# Patient Record
Sex: FEMALE | URBAN - METROPOLITAN AREA
[De-identification: names, ages, dates, MRNs, and addresses within clinical notes are randomized per-mention and may not be internally consistent; named-entity substitution may affect disease eponyms.]

---

## 2017-02-15 ENCOUNTER — PRENATAL OFFICE VISIT (OUTPATIENT)
Dept: NURSING | Facility: CLINIC | Age: 37
End: 2017-02-15
Payer: COMMERCIAL

## 2017-02-15 DIAGNOSIS — N91.2 AMENORRHEA: Primary | ICD-10-CM

## 2017-02-15 LAB — BETA HCG QUAL IFA URINE: POSITIVE

## 2017-02-15 PROCEDURE — 84703 CHORIONIC GONADOTROPIN ASSAY: CPT | Performed by: FAMILY MEDICINE

## 2017-02-15 PROCEDURE — 99207 ZZC NO CHARGE NURSE ONLY: CPT

## 2017-02-15 NOTE — MR AVS SNAPSHOT
"              After Visit Summary   2/15/2017    Misty ESCOBEDO Handler    MRN: [id number]           Patient Information     Date Of Birth          1980        Visit Information        Provider Department      2/15/2017 2:00 PM CR OB CLINIC [facility]        Today's Diagnoses     Amenorrhea    -  1       Follow-ups after your visit        Your next 10 appointments already scheduled     Mar 13, 2017  3:00 PM CDT   New Prenatal with Jeanine Crews MD   [facility] ([facility])    [address] [place] [id number] [phone]              Who to contact     If you have questions or need follow up information about today's clinic visit or your schedule please contact [facility] directly at [phone].  Normal or non-critical lab and imaging results will be communicated to you by MyChart, letter or phone within 4 business days after the clinic has received the results. If you do not hear from us within 7 days, please contact the clinic through MyChart or phone. If you have a critical or abnormal lab result, we will notify you by phone as soon as possible.  Submit refill requests through Mowbly or call your pharmacy and they will forward the refill request to us. Please allow 3 business days for your refill to be completed.          Additional Information About Your Visit        judohart Information     Mowbly lets you send messages to your doctor, view your test results, renew your prescriptions, schedule appointments and more. To sign up, go to [email or web address] . Click on \"Log in\" on the left side of the screen, which will take you to the Welcome page. Then click on \"Sign up Now\" on the right side of the page.     You will be asked to enter the access code listed below, as well as some personal information. Please follow the directions to create your username and password.     Your access code is: " [email or web address]  Expires: 2017  3:02 PM     Your access code will  in 90 days. If you need help or a new code, please call your [place] clinic or [phone].        Care EveryWhere ID     This is your Care EveryWhere ID. This could be used by other organizations to access your [place] medical records  [id number]        Your Vitals Were     Last Period                   2016 (Exact Date)            Blood Pressure from Last 3 Encounters:   08/31/15 120/77   08/26/15 147/90   13 124/82    Weight from Last 3 Encounters:   08/31/15 155 lb (70.3 kg)   08/26/15 155 lb 6.4 oz (70.5 kg)   13 149 lb (67.6 kg)              We Performed the Following     Beta HCG qual IFA urine        Primary Care Provider Office Phone # Fax #    Jeanine Crews [phone]       [facility] [address] [facility] [address]        Thank you!     Thank you for choosing [facility]  for your care. Our goal is always to provide you with excellent care. Hearing back from our patients is one way we can continue to improve our services. Please take a few minutes to complete the written survey that you may receive in the mail after your visit with us. Thank you!             Your Updated Medication List - Protect others around you: Learn how to safely use, store and throw away your medicines at www.disposemymeds.org.          This list is accurate as of: 2/15/17  3:02 PM.  Always use your most recent med list.                   Brand Name Dispense Instructions for use    fluocinolone acetonide 0.01 % Oil     1 Bottle    Reported on 2/15/2017       NO ACTIVE MEDICATIONS          PRENATAL ONE DAILY 27-0.8 MG Tabs     100 tablet    Take 1 tablet by mouth daily       triamcinolone 0.1 % lotion    KENALOG    60 mL    Apply sparingly to affected area three times daily as needed.  Gets at derm and uses as needed

## 2017-02-15 NOTE — NURSING NOTE
Subjective: 36 year old patient presents for pregnancy confirmation. Her last menstrual period was 16. She has had a positive home test This is her 3rd pregnancy pregnancy, G3, P1. She has had previous  and one miscarriage. She would like to be seen Dr. Cao for her prenatal care. She has started prenatal vitamins.      Exam:  General Appearance: appears well.  Her urine pregnancy test is positive.    Assessment: positive pregnancy confirmation.    Plan: Patient has an EDC of 10/1/17. Is currently 7w3d weeks along. I schedule her first OB appointment with Dr. Cao on . Risk assessment done. We discussed basic pregnancy care, diet, exercise and prenatal vitamins.     Pre Term Labor Risk Assessment   1. Is the patient's age <18 or >40? no  2. Does the patient have a BMI < 18.5? no  3. If previous pregnancy, was delivery within previous 6 months? no  4. Have you ever been diagnosed with pyelonephritis? no  5. Have you ever delivered a baby prior to 37 weeks gestation? no  6. Have you ever been told you have a uterine anomaly? no  7. Do you currently have uterine fibroids? no  8. Have you had any gynecological surgical procedures such as cervical conization, a LEEP procedure, laser treatment or cryosurgery of the cervix? no  9. Did your mother take DEBBIE or any other hormones when she was pregnant with you? no  10. Did conception for this pregnancy occur via In Vitro Fertilization? no  11. Are you carrying twins? no  12. Do you currently use tobacco products? no  13. Prior to this pregnancy, how much alcohol did you drink each week? (if >7/week= high risk..)  couple  14. Since you learned you were pregnant, how much beer, wine or hard liquor did you drink per week? (anything >0 = high risk) no  15. Prior to learning you are pregnant, did you use any of the following: marijuana, prescription narcotics, speed, cocaine, heroin, hallucinogens or other drugs? (any use within 1 yr = high risk)   none  16. Since you learned you are pregnant, have you used any of the following: marijuana, prescription narcotics, speed, cocaine, heroin, hallucinogens or other drugs? (any use = high risk)  none  17. Do you have a history of chemical dependency (yes=high risk)  no  18. Have you ever been treated for more than 1 Urinary Tract Infection during this pregnancy? no  19. Do you have a history of Depression, Bi-polar disorder, anxiety, schizophrenia or other mental illness?  no  20. Are you currently being treated for depression, bi-polar disorder, anxiety, schizophrenia or other mental illness? no  21. Have you had Chlamydia or gonorrhea during this pregnancy? no  22. Periodontal disease (gum disease)? no  23. Has anyone hit, slapped, kicked or otherwise hurt you? no  24. Has anyone forced you to have sex when you didn't want to? no  Summary:   Patient is not high risk for  Labor

## 2017-03-13 ENCOUNTER — RESULT FOLLOW UP (OUTPATIENT)
Dept: FAMILY MEDICINE | Facility: CLINIC | Age: 37
End: 2017-03-13

## 2017-03-13 ENCOUNTER — PRENATAL OFFICE VISIT (OUTPATIENT)
Dept: FAMILY MEDICINE | Facility: CLINIC | Age: 37
End: 2017-03-13
Payer: COMMERCIAL

## 2017-03-13 VITALS
DIASTOLIC BLOOD PRESSURE: 82 MMHG | BODY MASS INDEX: 30.58 KG/M2 | SYSTOLIC BLOOD PRESSURE: 116 MMHG | WEIGHT: 162 LBS | HEART RATE: 109 BPM | HEIGHT: 61 IN | TEMPERATURE: 98.5 F

## 2017-03-13 DIAGNOSIS — Z34.91 PRENATAL CARE, FIRST TRIMESTER: Primary | ICD-10-CM

## 2017-03-13 DIAGNOSIS — Z11.51 SCREENING FOR HUMAN PAPILLOMAVIRUS: ICD-10-CM

## 2017-03-13 DIAGNOSIS — R87.810 CERVICAL HIGH RISK HPV (HUMAN PAPILLOMAVIRUS) TEST POSITIVE: ICD-10-CM

## 2017-03-13 DIAGNOSIS — R87.610 PAP SMEAR WITH ATYPICAL SQUAMOUS CELLS, CANNOT EXCLUDE HIGH GRADE SQUAMOUS INTRAEPITHELIAL LESION (ASC-H): ICD-10-CM

## 2017-03-13 LAB
ALBUMIN UR-MCNC: NEGATIVE MG/DL
APPEARANCE UR: CLEAR
BILIRUB UR QL STRIP: NEGATIVE
COLOR UR AUTO: YELLOW
ERYTHROCYTE [DISTWIDTH] IN BLOOD BY AUTOMATED COUNT: 12.7 % (ref 10–15)
GLUCOSE UR STRIP-MCNC: NEGATIVE MG/DL
HCT VFR BLD AUTO: 39.3 % (ref 35–47)
HGB BLD-MCNC: 13.4 G/DL (ref 11.7–15.7)
HGB UR QL STRIP: ABNORMAL
KETONES UR STRIP-MCNC: NEGATIVE MG/DL
LEUKOCYTE ESTERASE UR QL STRIP: NEGATIVE
MCH RBC QN AUTO: 30.9 PG (ref 26.5–33)
MCHC RBC AUTO-ENTMCNC: 34.1 G/DL (ref 31.5–36.5)
MCV RBC AUTO: 91 FL (ref 78–100)
MUCOUS THREADS #/AREA URNS LPF: PRESENT /LPF
NITRATE UR QL: NEGATIVE
PH UR STRIP: 5.5 PH (ref 5–7)
PLATELET # BLD AUTO: 362 10E9/L (ref 150–450)
RBC # BLD AUTO: 4.34 10E12/L (ref 3.8–5.2)
RBC #/AREA URNS AUTO: ABNORMAL /HPF (ref 0–2)
SP GR UR STRIP: 1.01 (ref 1–1.03)
URN SPEC COLLECT METH UR: ABNORMAL
UROBILINOGEN UR STRIP-ACNC: 0.2 EU/DL (ref 0.2–1)
WBC # BLD AUTO: 10.1 10E9/L (ref 4–11)
WBC #/AREA URNS AUTO: ABNORMAL /HPF (ref 0–2)

## 2017-03-13 PROCEDURE — 87389 HIV-1 AG W/HIV-1&-2 AB AG IA: CPT | Performed by: FAMILY MEDICINE

## 2017-03-13 PROCEDURE — 87591 N.GONORRHOEAE DNA AMP PROB: CPT | Performed by: FAMILY MEDICINE

## 2017-03-13 PROCEDURE — 86900 BLOOD TYPING SEROLOGIC ABO: CPT | Performed by: FAMILY MEDICINE

## 2017-03-13 PROCEDURE — 36415 COLL VENOUS BLD VENIPUNCTURE: CPT | Performed by: FAMILY MEDICINE

## 2017-03-13 PROCEDURE — 86901 BLOOD TYPING SEROLOGIC RH(D): CPT | Performed by: FAMILY MEDICINE

## 2017-03-13 PROCEDURE — 81001 URINALYSIS AUTO W/SCOPE: CPT | Performed by: FAMILY MEDICINE

## 2017-03-13 PROCEDURE — 99207 ZZC FIRST OB VISIT: CPT | Performed by: FAMILY MEDICINE

## 2017-03-13 PROCEDURE — G0145 SCR C/V CYTO,THINLAYER,RESCR: HCPCS | Performed by: FAMILY MEDICINE

## 2017-03-13 PROCEDURE — 87624 HPV HI-RISK TYP POOLED RSLT: CPT | Performed by: FAMILY MEDICINE

## 2017-03-13 PROCEDURE — 86762 RUBELLA ANTIBODY: CPT | Performed by: FAMILY MEDICINE

## 2017-03-13 PROCEDURE — 86850 RBC ANTIBODY SCREEN: CPT | Performed by: FAMILY MEDICINE

## 2017-03-13 PROCEDURE — 86780 TREPONEMA PALLIDUM: CPT | Performed by: FAMILY MEDICINE

## 2017-03-13 PROCEDURE — 87340 HEPATITIS B SURFACE AG IA: CPT | Performed by: FAMILY MEDICINE

## 2017-03-13 PROCEDURE — 85027 COMPLETE CBC AUTOMATED: CPT | Performed by: FAMILY MEDICINE

## 2017-03-13 PROCEDURE — 87491 CHLMYD TRACH DNA AMP PROBE: CPT | Performed by: FAMILY MEDICINE

## 2017-03-13 PROCEDURE — 87086 URINE CULTURE/COLONY COUNT: CPT | Performed by: FAMILY MEDICINE

## 2017-03-13 NOTE — LETTER
April 17, 2018      Misty ESCOBEDO Handler  [address] [facility] [address]    Dear ,      At [facility], your health and wellness is our primary concern. 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 Your provider had recommended that you have a Colposcopy completed by 5/19/18. Our records do not show that this has been scheduled.    It is important to complete the follow up that your provider has suggested for you to ensure that there are no worsening changes which may, over time, develop into cancer.      Please contact our office at  [phone] to schedule an appointment for a Colposcopy at your earliest convenience. If you have questions or concerns, please call the clinic and we will be happy to assist you.    If you have completed the tests outside of [facility], please have the results forwarded to our office. We will update the chart for your primary Physician to review before your next annual physical.     Thank you for choosing [facility]!    Sincerely,      Jeanine Crews MD/mica

## 2017-03-13 NOTE — PROGRESS NOTES
"Subjective:  Misty ESCOBEDO Handler is a 36 year old female  who presents today for her first prenatal visit.  She has never had an abnormal PAP smear.  Her LMP was 16.  She has had trouble with nausea.  This is her 3rd pregnancy.  She is a G 3 P 1011.    Her EDC is 10/1/2017.  She has the following questions:  1- at dentist and she needs a filling.   2-  She is wondering about coffee intake during pregnancy.       Current Outpatient Prescriptions   Medication Sig Dispense Refill     triamcinolone (KENALOG) 0.1 % lotion Apply sparingly to affected area three times daily as needed.  Gets at derm and uses as needed 60 mL 0     Prenatal Vit-Fe Fumarate-FA (PRENATAL ONE DAILY) 27-0.8 MG TABS Take 1 tablet by mouth daily 100 tablet 3       Past Medical History   Diagnosis Date     Abnormal Pap smear, can't excl hi gd sq intraepithelial lesion (ASC-H) 01/10/11     Papanicolaou smear of cervix with atypical squamous cells of undetermined significance (ASC-US)      repeat PAP nl, no colposcopy or biopsy done.      Unspecified contraceptive management        Past Surgical History   Procedure Laterality Date     C/section, low transverse  11     , Low Transverse for failure to descend       Family History   Problem Relation Age of Onset     Arthritis Mother      Family History Negative No family hx of        Social History   Substance Use Topics     Smoking status: Former Smoker     Years: 5.00     Quit date: 2006     Smokeless tobacco: Never Used     Alcohol use No       Objective:  Blood pressure 116/82, pulse 109, temperature 98.5  F (36.9  C), temperature source Oral, height 5' 1\" (1.549 m), weight 162 lb (73.5 kg), last menstrual period 2016, unknown if currently breastfeeding.  General appearance: Healthy.  Mental Status: cooperative, normal affect, no gross thought process defects.  HEENT:   Ears- Normal external canals and TMs  Eyes- PERRL, EOM's intact, fundi benign, sharp disc " margins.  Throat- Normal  Nodes- Negative  Thyroid: Normal to palpation, no enlargement or nodules noted.  Breasts: Symmetric without mass tenderness or discharge.  Axillary nodes negative.  Lungs: Clear to auscultation bilaterally  Heart.: Normal rate and rhythm.  No murmurs, clicks or gallops.  Pulses:    R-4/4, PT-4/4, DP-4/4  Abdomen: BS active. Soft, non-tender, no masses or organomegaly.  Aorta normal. No bruits.  Genitalia: Normal female external genitalia without lesions.  Speculum:  Cervix normal,  Pap taken, bimanual exam  12 week size uterus, no adenexal mass or tenderness.  Doptone UNABLE.  Extremities: Normal without edema  Reflexes:  Symmetric bilaterally and 2 + at the patella  Skin: Clear and free of rash.    Assessment:  1. Misty Nieves is a healthy 36 year old female here for a first prenatal visit.  2. Unable to get hearttones    Plan:  1. A Pap smear was obtained  2. Prenatal labs ordered - see epicare orders  3. Recheck in one week for heart tones  4. fetal anatomy survey to be done around 20 weeks gestation

## 2017-03-13 NOTE — MR AVS SNAPSHOT
After Visit Summary   3/13/2017    Misty ESCOBEDO Handler    MRN: [id number]           Patient Information     Date Of Birth          1980        Visit Information        Provider Department      3/13/2017 3:00 PM Jeanine Crews MD [facility]        Today's Diagnoses     Prenatal care, first trimester    -  1      Care Instructions      Fetal development begins soon after conception. Find out how your baby grows and develops during the first trimester.    You're pregnant. Congratulations! You'll undoubtedly spend the months ahead wondering how your baby is growing and developing. What does your baby look like? How big is he or she? When will you feel the first kick?  Fetal development typically follows a predictable course. Find out what happens during the first trimester by checking out this weekly calendar of events. Keep in mind that measurements are approximate.  It might seem strange, but you're not actually pregnant the first week or two of the time allotted to your pregnancy. Yes, you read that correctly!  Conception typically occurs about two weeks after your last period begins. To calculate your due date, your health care provider will count ahead 40 weeks from the start of your last period. This means your period is counted as part of your pregnancy -- even though you weren't pregnant at the time.  The sperm and egg unite in one of your fallopian tubes to form a one-celled entity called a zygote. If more than one egg is released and fertilized, you might have multiple zygotes.  The zygote typically has 46 chromosomes -- 23 from you and 23 from the father. These chromosomes help determine your baby's sex, traits such as eye and hair color, and, to some extent, personality and intelligence.  Soon after fertilization, the zygote travels down the fallopian tube toward the uterus. At the same time, it will begin dividing to form a cluster of cells resembling a tiny raspberry --  a morula.  By the time it reaches the uterus, the rapidly dividing ball of cells -- now known as a blastocyst -- has  into two sections.  The inner group of cells will become the embryo. The outer group will become the cells that nourish and protect it. On contact, the blastocyst will burrow into the uterine wall for nourishment. This process is called implantation.  The placenta, which will nourish your baby throughout the pregnancy, also begins to form.   The fifth week of pregnancy, or the third week after conception, marks the beginning of the embryonic period. This is when the baby's brain, spinal cord, heart and other organs begin to form.  The embryo is now made of three layers. The top layer -- the ectoderm -- will give rise to your baby's outermost layer of skin, central and peripheral nervous systems, eyes, inner ears, and many connective tissues.  Your baby's heart and a primitive circulatory system will form in the middle layer of cells -- the mesoderm. This layer of cells will also serve as the foundation for your baby's bones, muscles, kidneys and much of the reproductive system.  The inner layer of cells -- the endoderm -- will become a simple tube lined with mucous membranes. Your baby's lungs, intestines and bladder will develop here.  By the end of this week, your baby is likely about the size of the tip of a pen.  Growth is rapid this week. Just four weeks after conception, the neural tube along your baby's back is closing and your baby's heart is pumping blood.  Basic facial features will begin to appear, including passageways that will make up the inner ears and arches that will contribute to the jaw. Your baby's body begins to take on a C-shaped curvature. Small buds will soon become arms and legs.  Seven weeks into your pregnancy, or five weeks after conception, your baby's brain and face are rapidly developing. Tiny nostrils become visible, and the eye lenses begin to form. The arm  buds that sprouted last week now take on the shape of paddles.  By the end of this week, your baby might be a little bigger than the top of a pencil eraser.  Eight weeks into your pregnancy, or six weeks after conception, your baby's arms and legs are growing longer, and fingers have begun to form. The shell-shaped parts of your baby's ears also are forming, and your baby's eyes are visible. The upper lip and nose have formed. The trunk of your baby's body is beginning to straighten.  By the end of this week, your baby might be about 1/2 inch (11 to 14 millimeters) long.  In the ninth week of pregnancy, or seven weeks after conception, your baby's arms grow, develop bones and bend at the elbows. Toes form, and your baby's eyelids and ears continue developing.   By the end of this week, your baby might be about 3/4 inch (20 millimeters) long.   By the 10th week of pregnancy, or eight weeks after conception, your baby's head has become more round. The neck begins to develop, and your baby's eyelids begin to close to protect his or her developing eyes.   At the beginning of the 11th week of pregnancy, or the ninth week after conception, your baby's head still makes up about half of its length. However, your baby's body is about to catch up, growing rapidly in the coming weeks.  Your baby is now officially described as a fetus. This week your baby's eyes are widely , the eyelids fused and the ears low set. Red blood cells are beginning to form in your baby's liver. By the end of this week, your baby's external genitalia will start developing into a penis or clitoris and labia majora.  By now your baby might measure about 2 inches (50 millimeters) long from crown to rump and weigh almost 1/3 ounce (8 grams).  Twelve weeks into your pregnancy, or 10 weeks after conception, your baby is developing fingernails. Your baby's face now has a human profile.  By now your baby might be about 2 1/2 inches (60 millimeters)  "long from crown to rump and weigh about 1/2 ounce (14 grams).                Follow-ups after your visit        Future tests that were ordered for you today     Open Future Orders        Priority Expected Expires Ordered    US OB > 14 Weeks Complete Single Routine  3/13/2018 3/13/2017            Who to contact     If you have questions or need follow up information about today's clinic visit or your schedule please contact [facility] directly at [phone].  Normal or non-critical lab and imaging results will be communicated to you by WebPayhart, letter or phone within 4 business days after the clinic has received the results. If you do not hear from us within 7 days, please contact the clinic through WebPayhart or phone. If you have a critical or abnormal lab result, we will notify you by phone as soon as possible.  Submit refill requests through ScaleOut Software or call your pharmacy and they will forward the refill request to us. Please allow 3 business days for your refill to be completed.          Additional Information About Your Visit        ScaleOut Software Information     ScaleOut Software lets you send messages to your doctor, view your test results, renew your prescriptions, schedule appointments and more. To sign up, go to [email or web address] . Click on \"Log in\" on the left side of the screen, which will take you to the Welcome page. Then click on \"Sign up Now\" on the right side of the page.     You will be asked to enter the access code listed below, as well as some personal information. Please follow the directions to create your username and password.     Your access code is: [email or web address]  Expires: 2017  4:02 PM     Your access code will  in 90 days. If you need help or a new code, please call your [place] clinic or [phone].        Care EveryWhere ID     This is your Care EveryWhere ID. This could be used by other organizations to access your [facility] medical records  [id number]        Your " "Vitals Were     Pulse Temperature Height Last Period BMI (Body Mass Index)       109 98.5  F (36.9  C) (Oral) 5' 1\" (1.549 m) 12/25/2016 (Exact Date) 30.61 kg/m2        Blood Pressure from Last 3 Encounters:   03/13/17 116/82   08/31/15 120/77   08/26/15 147/90    Weight from Last 3 Encounters:   03/13/17 162 lb (73.5 kg)   08/31/15 155 lb (70.3 kg)   08/26/15 155 lb 6.4 oz (70.5 kg)              We Performed the Following     ABO/Rh type and screen     Anti Treponema     CBC with platelets     Chlamydia trachomatis PCR     Hepatitis B surface antigen     HIV Antigen Antibody Combo     Neisseria gonorrhoeae PCR     PAP imaged thin layer screen     Rubella Antibody IgG Quantitative     UA reflex to Microscopic and Culture     Urine Culture Aerobic Bacterial          Today's Medication Changes          These changes are accurate as of: 3/13/17  3:58 PM.  If you have any questions, ask your nurse or doctor.               Stop taking these medicines if you haven't already. Please contact your care team if you have questions.     NO ACTIVE MEDICATIONS   Stopped by:  Jeanine Crews MD                    Primary Care Provider Office Phone # Fax #    Jeanine Crews [phone]       [facility] [id number] [place] [address]        Thank you!     Thank you for choosing [facility]  for your care. Our goal is always to provide you with excellent care. Hearing back from our patients is one way we can continue to improve our services. Please take a few minutes to complete the written survey that you may receive in the mail after your visit with us. Thank you!             Your Updated Medication List - Protect others around you: Learn how to safely use, store and throw away your medicines at www.disposemymeds.org.          This list is accurate as of: 3/13/17  3:58 PM.  Always use your most recent med list.                   Brand Name Dispense Instructions for use    " PRENATAL ONE DAILY 27-0.8 MG Tabs     100 tablet    Take 1 tablet by mouth daily       triamcinolone 0.1 % lotion    KENALOG    60 mL    Apply sparingly to affected area three times daily as needed.  Gets at derm and uses as needed

## 2017-03-13 NOTE — NURSING NOTE
"Chief Complaint   Patient presents with     Prenatal Care     11 weeks 1 day       Initial /82 (BP Location: Right arm, Patient Position: Chair, Cuff Size: Adult Regular)  Pulse 109  Temp 98.5  F (36.9  C) (Oral)  Ht 5' 1\" (1.549 m)  Wt 162 lb (73.5 kg)  LMP 12/25/2016 (Exact Date)  BMI 30.61 kg/m2 Estimated body mass index is 30.61 kg/(m^2) as calculated from the following:    Height as of this encounter: 5' 1\" (1.549 m).    Weight as of this encounter: 162 lb (73.5 kg).  Medication Reconciliation: complete     Karli Billings CMA      "

## 2017-03-13 NOTE — LETTER
August 8, 2018      Misty ESCOBEDO Handler  [address] [facility] [address]    Dear ,      At [facility], your health and wellness is our primary concern. 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 Your provider had recommended that you have a Colposcopy completed by 5/19/18. Our records do not show that this has been scheduled.     It is important to complete the follow up that your provider has suggested for you to ensure that there are no worsening changes which may, over time, develop into cancer.      If you have chosen not to do the recommended colposcopy, please contact our office at [phone] to schedule an appointment for a repeat PAP smear and HPV test at your earliest convenience.    If you have completed the tests outside of [facility], please have the results forwarded to our office. We will update the chart for your primary Physician to review before your next annual physical.     Thank you for choosing [facility]!    Sincerely,      Jeanine Crews MD/mica

## 2017-03-14 LAB
ABO + RH BLD: NORMAL
ABO + RH BLD: NORMAL
BLD GP AB SCN SERPL QL: NORMAL
BLOOD BANK CMNT PATIENT-IMP: NORMAL
SPECIMEN EXP DATE BLD: NORMAL

## 2017-03-15 PROBLEM — O09.899 RUBELLA NON-IMMUNE STATUS, ANTEPARTUM: Status: ACTIVE | Noted: 2017-03-15

## 2017-03-15 PROBLEM — Z28.39 RUBELLA NON-IMMUNE STATUS, ANTEPARTUM: Status: ACTIVE | Noted: 2017-03-15

## 2017-03-15 LAB
BACTERIA SPEC CULT: NORMAL
C TRACH DNA SPEC QL NAA+PROBE: NORMAL
HBV SURFACE AG SERPL QL IA: NONREACTIVE
HIV 1+2 AB+HIV1 P24 AG SERPL QL IA: NORMAL
MICRO REPORT STATUS: NORMAL
N GONORRHOEA DNA SPEC QL NAA+PROBE: NORMAL
RUBV IGG SERPL IA-ACNC: 8 IU/ML
SPECIMEN SOURCE: NORMAL
T PALLIDUM IGG+IGM SER QL: NEGATIVE

## 2017-03-16 LAB
COPATH REPORT: NORMAL
PAP: NORMAL

## 2017-03-21 ENCOUNTER — TELEPHONE (OUTPATIENT)
Dept: FAMILY MEDICINE | Facility: CLINIC | Age: 37
End: 2017-03-21

## 2017-03-21 NOTE — TELEPHONE ENCOUNTER
Misty ESCOBEDO Handler is a 36 year old female who calls to report she is 11 weeks pregnant. Is scheduled to see IVONE tomorrow 10:45 AM for prenatal care. Had first ob visit with MD last week, tomorrow's visit to check for heartbeat .   3rd pregnancy   1 previous miscarriage so is concerned about vag discharge   One day brownish vaginal discharge on toilet tissue.  Concerned this might be early sign miscarriage.  Denies uterine cramping, abdominal/pelvic discomfort/pain, discomfort/pain in back . No pain.  No bright red blood   No urinary symptoms    Asks if OK to wait for appointment tomorrow?   Advised OK to wait.  However, if any bright red bleeding or pelvic cramping/pain to ER immediately.   Caller agrees to plan.  Guideline used: 1st Trimester Bleeding from  Telephone Triage for Obstetrics and Gynecology, Ally Hylton and Claudia Alvarez, RN

## 2017-03-22 ENCOUNTER — TELEPHONE (OUTPATIENT)
Dept: OBGYN | Facility: CLINIC | Age: 37
End: 2017-03-22

## 2017-03-22 ENCOUNTER — PRENATAL OFFICE VISIT (OUTPATIENT)
Dept: FAMILY MEDICINE | Facility: CLINIC | Age: 37
End: 2017-03-22
Payer: COMMERCIAL

## 2017-03-22 ENCOUNTER — RADIANT APPOINTMENT (OUTPATIENT)
Dept: ULTRASOUND IMAGING | Facility: CLINIC | Age: 37
End: 2017-03-22
Attending: FAMILY MEDICINE
Payer: COMMERCIAL

## 2017-03-22 ENCOUNTER — OFFICE VISIT (OUTPATIENT)
Dept: OBGYN | Facility: CLINIC | Age: 37
End: 2017-03-22
Payer: COMMERCIAL

## 2017-03-22 ENCOUNTER — TELEPHONE (OUTPATIENT)
Dept: FAMILY MEDICINE | Facility: CLINIC | Age: 37
End: 2017-03-22

## 2017-03-22 VITALS
SYSTOLIC BLOOD PRESSURE: 128 MMHG | WEIGHT: 160 LBS | HEIGHT: 61 IN | TEMPERATURE: 98.2 F | BODY MASS INDEX: 30.21 KG/M2 | DIASTOLIC BLOOD PRESSURE: 86 MMHG

## 2017-03-22 VITALS
BODY MASS INDEX: 30.29 KG/M2 | DIASTOLIC BLOOD PRESSURE: 86 MMHG | WEIGHT: 160.4 LBS | TEMPERATURE: 98.2 F | HEART RATE: 110 BPM | OXYGEN SATURATION: 99 % | HEIGHT: 61 IN | SYSTOLIC BLOOD PRESSURE: 128 MMHG | RESPIRATION RATE: 20 BRPM

## 2017-03-22 DIAGNOSIS — O20.9 ACCIDENTAL ANTEPARTUM HEMORRHAGE IN FIRST TRIMESTER: ICD-10-CM

## 2017-03-22 DIAGNOSIS — O02.1 MISSED ABORTION: Primary | ICD-10-CM

## 2017-03-22 DIAGNOSIS — O20.9 ACCIDENTAL ANTEPARTUM HEMORRHAGE IN FIRST TRIMESTER: Primary | ICD-10-CM

## 2017-03-22 LAB
FINAL DIAGNOSIS: ABNORMAL
HPV HR 12 DNA CVX QL NAA+PROBE: NEGATIVE
HPV16 DNA SPEC QL NAA+PROBE: POSITIVE
HPV18 DNA SPEC QL NAA+PROBE: NEGATIVE
SPECIMEN DESCRIPTION: ABNORMAL

## 2017-03-22 PROCEDURE — 99203 OFFICE O/P NEW LOW 30 MIN: CPT | Performed by: OBSTETRICS & GYNECOLOGY

## 2017-03-22 PROCEDURE — 76817 TRANSVAGINAL US OBSTETRIC: CPT | Mod: 59 | Performed by: OBSTETRICS & GYNECOLOGY

## 2017-03-22 PROCEDURE — 99207 ZZC PRENATAL VISIT: CPT | Performed by: FAMILY MEDICINE

## 2017-03-22 PROCEDURE — 76801 OB US < 14 WKS SINGLE FETUS: CPT | Performed by: OBSTETRICS & GYNECOLOGY

## 2017-03-22 RX ORDER — MISOPROSTOL 200 UG/1
800 TABLET ORAL EVERY 12 HOURS PRN
Qty: 4 TABLET | Refills: 0 | Status: SHIPPED | OUTPATIENT
Start: 2017-03-22 | End: 2017-03-22

## 2017-03-22 RX ORDER — PHENAZOPYRIDINE HYDROCHLORIDE 100 MG/1
200 TABLET, FILM COATED ORAL ONCE
Status: CANCELLED | OUTPATIENT
Start: 2017-03-22 | End: 2017-03-22

## 2017-03-22 RX ORDER — MISOPROSTOL 200 UG/1
800 TABLET ORAL EVERY 12 HOURS PRN
Qty: 4 TABLET | Refills: 0 | Status: SHIPPED | OUTPATIENT
Start: 2017-03-22 | End: 2017-08-09

## 2017-03-22 NOTE — TELEPHONE ENCOUNTER
Aline calling from ultrasound, she did not hear fetal heart tones  spoke to Dr Cao,  Plan to contact OB-GYN to evaluate pt and will then contact pt    Call cell at [phone]    Route to PCP    Eliza Roman RN, BS  Clinical Nurse Triage.

## 2017-03-22 NOTE — MR AVS SNAPSHOT
After Visit Summary   3/22/2017    Misty ESCOBEDO Handler    MRN: [id number]           Patient Information     Date Of Birth          1980        Visit Information        Provider Department      3/22/2017 3:45 PM Meet Bhakta MD [facility]        Today's Diagnoses     Missed     -  1       Follow-ups after your visit        Your next 10 appointments already scheduled     Mar 23, 2017   Procedure with Khushi Villasenor, DO   [facility] (--)    [address] [facility] [id number] [phone]            Mar 23, 2017  7:30 AM CDT   US INTRAOPERATIVE with RHUS1   [facility] Ultras[facility] ([facility])    [address] [place] [id number] [phone]           Please bring a list of your medicines (including vitamins, minerals and over-the-counter drugs). Also, tell your doctor about any allergies you may have. Wear comfortable clothes and leave your valuables at home.  You do not need to do anything special to prepare for your exam.  Please call the Imaging Department at your exam site with any questions.            2017  3:30 PM CDT   US OB > 14 WEEKS COMPLETE SINGLE with RIUS1   [facility]    [address]  [address]  [facility] [id number] [phone]           Please bring a list of your medicines (including vitamins, minerals and over-the-counter drugs). Also, tell your doctor about any allergies you may have. Wear comfortable clothes and leave your valuables at home.  If you re less than 20 weeks drink four 8-ounce glasses of fluid an hour before your exam. If you need to empty your bladder before your exam, try to release only a little urine. Then, drink another glass of fluid.  You may have up to two family members in the exam room. If you bring a small child, an adult must be there to care for him or her.  Please call the  "Imaging Department at your exam site with any questions.              Future tests that were ordered for you today     Open Future Orders        Priority Expected Expires Ordered    US Intraoperative Routine  3/22/2018 3/22/2017            Who to contact     If you have questions or need follow up information about today's clinic visit or your schedule please contact [facility] directly at [phone].  Normal or non-critical lab and imaging results will be communicated to you by MyChart, letter or phone within 4 business days after the clinic has received the results. If you do not hear from us within 7 days, please contact the clinic through [employer] or phone. If you have a critical or abnormal lab result, we will notify you by phone as soon as possible.  Submit refill requests through Wriggle or call your pharmacy and they will forward the refill request to us. Please allow 3 business days for your refill to be completed.          Additional Information About Your Visit        Wriggle Information     Wriggle lets you send messages to your doctor, view your test results, renew your prescriptions, schedule appointments and more. To sign up, go to [email or web address] . Click on \"Log in\" on the left side of the screen, which will take you to the Welcome page. Then click on \"Sign up Now\" on the right side of the page.     You will be asked to enter the access code listed below, as well as some personal information. Please follow the directions to create your username and password.     Your access code is: [email or web address]  Expires: 2017  4:02 PM     Your access code will  in 90 days. If you need help or a new code, please call your [facility] clinic or [phone].        Care EveryWhere ID     This is your Care EveryWhere ID. This could be used by other organizations to access your [facility] medical records  [id number]        Your Vitals Were     Temperature Height Last Period BMI (Body " "Mass Index)          98.2  F (36.8  C) (Oral) 5' 1\" (1.549 m) 2016 (Exact Date) 30.23 kg/m2         Blood Pressure from Last 3 Encounters:   17 128/86   17 128/86   17 116/82    Weight from Last 3 Encounters:   17 160 lb (72.6 kg)   17 160 lb 6.4 oz (72.8 kg)   17 162 lb (73.5 kg)              Today, you had the following     No orders found for display         Today's Medication Changes          These changes are accurate as of: 3/22/17  9:50 PM.  If you have any questions, ask your nurse or doctor.               Start taking these medicines.        Dose/Directions    misoprostol 200 MCG tablet   Commonly known as:  CYTOTEC   Used for:  Missed    Started by:  Meet Bhakta MD        Dose:  800 mcg   Place 4 tablets (800 mcg) vaginally every 12 hours as needed   Quantity:  4 tablet   Refills:  0            Where to get your medicines      These medications were sent to [facility] [place], [place] - [address] [place] [address] [address] [facility] [address]     Phone:  [phone]     misoprostol 200 MCG tablet                Primary Care Provider Office Phone # Fax #    Jeanine Crews [phone]       [facility] [address] [facility] [address]        Thank you!     Thank you for choosing [facility]  for your care. Our goal is always to provide you with excellent care. Hearing back from our patients is one way we can continue to improve our services. Please take a few minutes to complete the written survey that you may receive in the mail after your visit with us. Thank you!             Your Updated Medication List - Protect others around you: Learn how to safely use, store and throw away your medicines at www.disposemymeds.org.          This list is accurate as of: 3/22/17  9:50 PM.  Always use your most recent med list.                   Brand Name Dispense Instructions for use    " misoprostol 200 MCG tablet    CYTOTEC    4 tablet    Place 4 tablets (800 mcg) vaginally every 12 hours as needed       PRENATAL ONE DAILY 27-0.8 MG Tabs     100 tablet    Take 1 tablet by mouth daily       triamcinolone 0.1 % lotion    KENALOG    60 mL    Apply sparingly to affected area three times daily as needed.  Gets at derm and uses as needed

## 2017-03-22 NOTE — NURSING NOTE
"Chief Complaint   Patient presents with     Prenatal Care     12 weeks 3 days, pt was seen last week and MD wanted patient to f/u this week     Vaginal Bleeding     has some mild inconsistent vaginal spotting the past couple days. No c/o of big clots. Patient states the spotting is dark brown in color and only when she is going to the bathroom. Patient called nurse triage line.        Initial /86 (BP Location: Right arm, Patient Position: Chair, Cuff Size: Adult Regular)  Pulse 110  Temp 98.2  F (36.8  C) (Oral)  Resp 20  Ht 5' 1\" (1.549 m)  Wt 160 lb 6.4 oz (72.8 kg)  LMP 12/25/2016 (Exact Date)  SpO2 99%  BMI 30.31 kg/m2 Estimated body mass index is 30.31 kg/(m^2) as calculated from the following:    Height as of this encounter: 5' 1\" (1.549 m).    Weight as of this encounter: 160 lb 6.4 oz (72.8 kg).  Medication Reconciliation: complete    Pt will discuss getting maternal screening with MD. Karli Billings CMA    "

## 2017-03-22 NOTE — PROGRESS NOTES
"  SUBJECTIVE:  Misty ESCOBEDO Handler is a 36 year old,  female, G:3 P1  who presents for missed . 7 weeks 2 days fetal pole size. Discussed expectant management , surgical and medical management with cytotec. Desires cytotec tonoight with D&C on Thursday if no results. Leaving town on Friday. Notes some spotting.    Past Medical History:   Diagnosis Date     Abnormal Pap smear, can't excl hi gd sq intraepithelial lesion (ASC-H) 01/10/11     Papanicolaou smear of cervix with atypical squamous cells of undetermined significance (ASC-US)     repeat PAP nl, no colposcopy or biopsy done.      Unspecified contraceptive management                                           Past Surgical History:   Procedure Laterality Date     C/SECTION, LOW TRANSVERSE  11    , Low Transverse for failure to descend       Current Outpatient Prescriptions   Medication     misoprostol (CYTOTEC) 200 MCG tablet     triamcinolone (KENALOG) 0.1 % lotion     Prenatal Vit-Fe Fumarate-FA (PRENATAL ONE DAILY) 27-0.8 MG TABS     No current facility-administered medications for this visit.          Allergies   Allergen Reactions     No Known Drug Allergies                                                    Social History   Substance Use Topics     Smoking status: Former Smoker     Years: 5.00     Quit date: 2006     Smokeless tobacco: Never Used     Alcohol use No                      Review of Systems    CONSTITUTIONAL:NEGATIVE  EYES: NEGATIVE  ENT/MOUTH: NEGATIVE  RESP: NEGATIVE  CV: NEGATIVE  GI: NEGATIVE  : NEGATIVE  MUSCULOSKELATAL: NEGATIVE  INTEGUMENTARY/SKIN: NEGATIVE  BREAST: NEGATIVE  NEURO: NEGATIVE.      OBJECTIVE:  /86 (BP Location: Right arm, Patient Position: Chair, Cuff Size: Adult Regular)  Temp 98.2  F (36.8  C) (Oral)  Ht 5' 1\" (1.549 m)  Wt 160 lb (72.6 kg)  LMP 2016 (Exact Date)  BMI 30.23 kg/m2  Pelvis: deferred        ASSESSMENT:  Missed     PLAN:    1)Cytotec prescribed, " "instructions given. Pre-op below if needed.            Pre Op Exam: 2017    Misty ESCOBEDO Handler is an 36 year old year old female  here for preop physical.     /86 (BP Location: Right arm, Patient Position: Chair, Cuff Size: Adult Regular)  Temp 98.2  F (36.8  C) (Oral)  Ht 5' 1\" (1.549 m)  Wt 160 lb (72.6 kg)  LMP 2016 (Exact Date)  BMI 30.23 kg/m2    Social History   Substance Use Topics     Smoking status: Former Smoker     Years: 5.00     Quit date: 2006     Smokeless tobacco: Never Used     Alcohol use No       PRESENT HISTORY:    Reason for admission:Missed   Onset of Illness: now  Type of Surgery Anticipated: Suction D&C  Type of Anesthesia Anticipated:  General      Medications:   Current Outpatient Prescriptions   Medication     triamcinolone (KENALOG) 0.1 % lotion     Prenatal Vit-Fe Fumarate-FA (PRENATAL ONE DAILY) 27-0.8 MG TABS     No current facility-administered medications for this visit.          Any Aspirin within 10 days? No      Family History: Review of patient's family history indicates:    Family History Negative        No family hx of             No family history of malignant hyperthermia.  No family history of bleeding disorder.  No history of Sleep Apnea    Past Medical History:   Diagnosis Date     Abnormal Pap smear, can't excl hi gd sq intraepithelial lesion (ASC-H) 01/10/11     Papanicolaou smear of cervix with atypical squamous cells of undetermined significance (ASC-US)     repeat PAP nl, no colposcopy or biopsy done.      Unspecified contraceptive management        Past Surgical History:   Procedure Laterality Date     C/SECTION, LOW TRANSVERSE  11    , Low Transverse for failure to descend       Allergies   Allergen Reactions     No Known Drug Allergies        Transfusion reactions: No prior transfusions    Bleeding tendencies:No bleeding problems noted      REVIEW OF SYSTEMS:    Cardiovascular: NORMAL  Respiratory: " "NORMAL  Gastrointestinal: NORMAL  Genitourinary: NORMAL    Patient's last menstrual period was 2016 (exact date)..      PHYSICAL EXAM:  /86 (BP Location: Right arm, Patient Position: Chair, Cuff Size: Adult Regular)  Temp 98.2  F (36.8  C) (Oral)  Ht 5' 1\" (1.549 m)  Wt 160 lb (72.6 kg)  LMP 2016 (Exact Date)  BMI 30.23 kg/m2   General appearance: Healthy.  Skin: Normal.  Mental Status: cooperative, normal affect, no gross thought process defects.  Thyroid: Normal to palpation, no enlargement or nodules noted.  Breasts: not done  Lungs: Clear to auscultation.   Heart.: Normal rate and rhythm.  No murmurs, clicks or gallops.   Abdomen: BS active. Soft, non-tender, no masses or organomegaly.    Pelvis: deferred  Extremities: deferred     Normal  Comments: Discussed indication, and risks of surgery, such as infection , bleeding, damage to bowel or bladder.    Impression: Missed       MD Signature: ________________________________________________  Meet Bhakta MD   "

## 2017-03-22 NOTE — PROGRESS NOTES
Patient returns today at 12 weeks to listen for heart tones.   Is still feeling some sickness and nausea but is better  Does mentions some old brown blood last few times she has urinated on toilet paper - no cramping  Going to [place] on vacation on Friday.     Will get u/s for viability today and if no heart beat, will refer to OB

## 2017-03-22 NOTE — MR AVS SNAPSHOT
After Visit Summary   3/22/2017    Misty ESCOBEDO Handler    MRN: [id number]           Patient Information     Date Of Birth          1980        Visit Information        Provider Department      3/22/2017 10:45 AM Jeanine Crews MD [facility]        Today's Diagnoses     Accidental antepartum hemorrhage in first trimester    -  1       Follow-ups after your visit        Your next 10 appointments already scheduled     Mar 22, 2017  2:45 PM CDT   US OB < 14 WEEKS SINGLE with OXUS1   [facility]    [address] [id number] [phone]           Please bring a list of your medicines (including vitamins, minerals and over-the-counter drugs). Also, tell your doctor about any allergies you may have. Wear comfortable clothes and leave your valuables at home.  If you re less than 20 weeks drink four 8-ounce glasses of fluid an hour before your exam. If you need to empty your bladder before your exam, try to release only a little urine. Then, drink another glass of fluid.  You may have up to two family members in the exam room. If you bring a small child, an adult must be there to care for him or her.  Please call the Imaging Department at your exam site with any questions.            Apr 04, 2017  3:30 PM CDT   US OB > 14 WEEKS COMPLETE SINGLE with RIUS1   [facility]    [address]  [facility] [address] [phone]           Please bring a list of your medicines (including vitamins, minerals and over-the-counter drugs). Also, tell your doctor about any allergies you may have. Wear comfortable clothes and leave your valuables at home.  If you re less than 20 weeks drink four 8-ounce glasses of fluid an hour before your exam. If you need to empty your bladder before your exam, try to release only a little urine. Then, drink  "another glass of fluid.  You may have up to two family members in the exam room. If you bring a small child, an adult must be there to care for him or her.  Please call the Imaging Department at your exam site with any questions.              Future tests that were ordered for you today     Open Future Orders        Priority Expected Expires Ordered    US OB < 14 Weeks Single Routine  3/22/2018 3/22/2017            Who to contact     If you have questions or need follow up information about today's clinic visit or your schedule please contact [facility] directly at [phone].  Normal or non-critical lab and imaging results will be communicated to you by [employer]hart, letter or phone within 4 business days after the clinic has received the results. If you do not hear from us within 7 days, please contact the clinic through [employer]hart or phone. If you have a critical or abnormal lab result, we will notify you by phone as soon as possible.  Submit refill requests through Serus or call your pharmacy and they will forward the refill request to us. Please allow 3 business days for your refill to be completed.          Additional Information About Your Visit        MyChart Information     Serus lets you send messages to your doctor, view your test results, renew your prescriptions, schedule appointments and more. To sign up, go to [email or web address] . Click on \"Log in\" on the left side of the screen, which will take you to the Welcome page. Then click on \"Sign up Now\" on the right side of the page.     You will be asked to enter the access code listed below, as well as some personal information. Please follow the directions to create your username and password.     Your access code is: [email or web address]  Expires: 2017  4:02 PM     Your access code will  in 90 days. If you need help or a new code, please call your [facility] or [phone].        Care EveryWhere ID     This is your Care " "EveryWhere ID. This could be used by other organizations to access your [facility] medical records  [id number]        Your Vitals Were     Pulse Temperature Respirations Height Last Period Pulse Oximetry    110 98.2  F (36.8  C) (Oral) 20 5' 1\" (1.549 m) 12/25/2016 (Exact Date) 99%    BMI (Body Mass Index)                   30.31 kg/m2            Blood Pressure from Last 3 Encounters:   03/22/17 128/86   03/13/17 116/82   08/31/15 120/77    Weight from Last 3 Encounters:   03/22/17 160 lb 6.4 oz (72.8 kg)   03/13/17 162 lb (73.5 kg)   08/31/15 155 lb (70.3 kg)               Primary Care Provider Office Phone # Fax #    Jeanine Crews [phone]       [facility] [address] [facility] [address]        Thank you!     Thank you for choosing [facility]  for your care. Our goal is always to provide you with excellent care. Hearing back from our patients is one way we can continue to improve our services. Please take a few minutes to complete the written survey that you may receive in the mail after your visit with us. Thank you!             Your Updated Medication List - Protect others around you: Learn how to safely use, store and throw away your medicines at www.disposemymeds.org.          This list is accurate as of: 3/22/17  1:49 PM.  Always use your most recent med list.                   Brand Name Dispense Instructions for use    PRENATAL ONE DAILY 27-0.8 MG Tabs     100 tablet    Take 1 tablet by mouth daily       triamcinolone 0.1 % lotion    KENALOG    60 mL    Apply sparingly to affected area three times daily as needed.  Gets at derm and uses as needed         "

## 2017-03-22 NOTE — TELEPHONE ENCOUNTER
Surgery:  ULTRASOUND GUIDED SUCTION D&C  Date:  3/23/17  Time:  7:30 AM  Hospital:  [facility]    Patient advised of the following:  The hospital will contact you 24-48 hours prior to surgery to discuss any pre op instructions.  Contact your insurance company to see if a prior authorization or second opinion is needed.  No aspirin or Ibuprofen 10 days prior to surgery.  Make arrangements to have someone drive you home from the hospital.    Surgery specific and hospital information given to patient.    Information was placed on the surgery calendar in [place].

## 2017-03-23 ENCOUNTER — TELEPHONE (OUTPATIENT)
Dept: NURSING | Facility: CLINIC | Age: 37
End: 2017-03-23

## 2017-03-23 NOTE — TELEPHONE ENCOUNTER
"Call Type: Triage Call    Presenting Problem: Patient is calling to\" cancel an appointment\" for  today on 03/23/17.  Triage Note:  Guideline Title: No Guideline - Advice Per Reference (Adult)  Recommended Disposition: Call Local Agency Immediately  Original Inclination: Wanted to make office appt  Override Disposition:  Intended Action: Call PCP/HCP  Physician Contacted: No  CALL LOCAL AGENCY IMMEDIATELY ?  YES  SEE ED IMMEDIATELY ? NO  CALL [facility] IMMEDIATELY ? NO  ACTIVATE  ? NO  CALL PROVIDER IMMEDIATELY ? NO  Physician Instructions:  Care Advice:  "

## 2017-03-23 NOTE — PROGRESS NOTES
01/10/11 ASC-H: Pregnant, repeat pap  02/14/11 ASCUS, positive HPV: referred for colp, no records recieved  09/19/11 NIL: repeat 1 year, due 09/19/12 04/26/13 Reminder letter sent  06/13/13 Left message to call back to schedule pap.  07/11/13 No call back and no appointment scheduled. Consider lost to pap follow up.  03/13/17 Pap= NIL,+ HR HPV type 16. Pt miscarried. Plan Repeat cotest in 6 months per provider.  03/23/17: I called the pt and she was informed of the results and recommendations.  02/19/18: HSIL pap, + HR HPV type 16 result. Plan [place] due bef 05/19/18.   02/27/18:Msg left to call back.   03/05/18:Msg left to call back. Pt was advised.  4/17/18 [place] reminder letter sent (rl)  06/07/18:3 month [place] not done. Tracking updated for 6 mo colp/pap.   8/8/18 [place]/Pap reminder letter sent (rl)  12/27/18 [facility] clinic and schedule. ([facility])  01/10/19 Patient is lost to pap tracking follow-up. HEIDI routed to provider. ([facility])

## 2017-08-09 ENCOUNTER — TELEPHONE (OUTPATIENT)
Dept: FAMILY MEDICINE | Facility: CLINIC | Age: 37
End: 2017-08-09

## 2017-08-09 ENCOUNTER — PRENATAL OFFICE VISIT (OUTPATIENT)
Dept: NURSING | Facility: CLINIC | Age: 37
End: 2017-08-09
Payer: COMMERCIAL

## 2017-08-09 VITALS
DIASTOLIC BLOOD PRESSURE: 85 MMHG | RESPIRATION RATE: 20 BRPM | HEART RATE: 101 BPM | OXYGEN SATURATION: 97 % | SYSTOLIC BLOOD PRESSURE: 126 MMHG

## 2017-08-09 DIAGNOSIS — Z34.91 PRENATAL CARE IN FIRST TRIMESTER: Primary | ICD-10-CM

## 2017-08-09 LAB — BETA HCG QUAL IFA URINE: POSITIVE

## 2017-08-09 PROCEDURE — 99207 ZZC NO CHARGE NURSE ONLY: CPT

## 2017-08-09 PROCEDURE — 84703 CHORIONIC GONADOTROPIN ASSAY: CPT | Performed by: FAMILY MEDICINE

## 2017-08-09 NOTE — NURSING NOTE
Subjective: 36 year old patient presents for pregnancy confirmation. Her last menstrual period was 17. She has had a positive home test This is her 4th pregnancy, G4, P1. She has had previous . She would like to be seen Dr. Cao for her prenatal care. She has started prenatal vitamins.      Exam:  General Appearance: appears well.  Her urine pregnancy test is positive.    Assessment: positive pregnancy confirmation.    Plan: Patient has an EDC of 3/31/18. Is currently 6w4d along. I scheduled her first OB appointment with Dr. Cao on 17 at 8:30 am. Risk assessment done. We discussed basic pregnancy care, diet, exercise and prenatal vitamins.     Pre Term Labor Risk Assessment   1. Is the patient's age <18 or >40? no  2. Does the patient have a BMI < 18.5? no  3. If previous pregnancy, was delivery within previous 6 months? no  4. Have you ever been diagnosed with pyelonephritis? no  5. Have you ever delivered a baby prior to 37 weeks gestation? no  6. Have you ever been told you have a uterine anomaly? no  7. Do you currently have uterine fibroids? no  8. Have you had any gynecological surgical procedures such as cervical conization, a LEEP procedure, laser treatment or cryosurgery of the cervix? no  9. Did your mother take DEBBIE or any other hormones when she was pregnant with you? no  10. Did conception for this pregnancy occur via In Vitro Fertilization? no  11. Are you carrying twins? no  12. Do you currently use tobacco products? no  13. Prior to this pregnancy, how much alcohol did you drink each week? (if >7/week= high risk..)  occas  14. Since you learned you were pregnant, how much beer, wine or hard liquor did you drink per week? (anything >0 = high risk) no  15. Prior to learning you are pregnant, did you use any of the following: marijuana, prescription narcotics, speed, cocaine, heroin, hallucinogens or other drugs? (any use within 1 yr = high risk)  no  16. Since you learned you are  pregnant, have you used any of the following: marijuana, prescription narcotics, speed, cocaine, heroin, hallucinogens or other drugs? (any use = high risk)  no  17. Do you have a history of chemical dependency (yes=high risk)  no  18. Have you ever been treated for more than 1 Urinary Tract Infection during this pregnancy? no  19. Do you have a history of Depression, Bi-polar disorder, anxiety, schizophrenia or other mental illness?  no  20. Are you currently being treated for depression, bi-polar disorder, anxiety, schizophrenia or other mental illness? no  21. Have you had Chlamydia or gonorrhea during this pregnancy? no  22. Periodontal disease (gum disease)? no  23. Has anyone hit, slapped, kicked or otherwise hurt you? no  24. Has anyone forced you to have sex when you didn't want to? no  Summary:   Patient is not high risk for  Labor   Dalia Benites RN

## 2017-08-09 NOTE — TELEPHONE ENCOUNTER
Lets get early ultrasound and serial beta HCG levels 2 days apart and make sure they are increasing like they should be

## 2017-08-09 NOTE — MR AVS SNAPSHOT
"              After Visit Summary   8/9/2017    Misty ESCOBEDO Handler    MRN: [id number]           Patient Information     Date Of Birth          1980        Visit Information        Provider Department      8/9/2017 11:00 AM CR OB CLINIC [facility]        Today's Diagnoses     Positive home pregnancy test    -  1       Follow-ups after your visit        Your next 10 appointments already scheduled     Sep 20, 2017  8:30 AM CDT   New Prenatal with Jeanine Crews MD   [facility] ([facility]    [address] [place] [address] [phone]              Who to contact     If you have questions or need follow up information about today's clinic visit or your schedule please contact [facility] directly at [phone].  Normal or non-critical lab and imaging results will be communicated to you by MyChart, letter or phone within 4 business days after the clinic has received the results. If you do not hear from us within 7 days, please contact the clinic through MyChart or phone. If you have a critical or abnormal lab result, we will notify you by phone as soon as possible.  Submit refill requests through [email or web address] or call your pharmacy and they will forward the refill request to us. Please allow 3 business days for your refill to be completed.          Additional Information About Your Visit        MyChart Information     [email or web address] lets you send messages to your doctor, view your test results, renew your prescriptions, schedule appointments and more. To sign up, go to [email or web address] . Click on \"Log in\" on the left side of the screen, which will take you to the Welcome page. Then click on \"Sign up Now\" on the right side of the page.     You will be asked to enter the access code listed below, as well as some personal information. Please follow the directions to create your username and password.     Your access code is: " [email or web address]  Expires: 2017 11:48 AM     Your access code will  in 90 days. If you need help or a new code, please call your [place] clinic or [phone].        Care EveryWhere ID     This is your Care EveryWhere ID. This could be used by other organizations to access your [place] medical records  [id number]        Your Vitals Were     Pulse Respirations Last Period Pulse Oximetry Breastfeeding?       101 20 2017 97% Unknown        Blood Pressure from Last 3 Encounters:   17 126/85   17 128/86   17 128/86    Weight from Last 3 Encounters:   17 160 lb (72.6 kg)   17 160 lb 6.4 oz (72.8 kg)   17 162 lb (73.5 kg)              We Performed the Following     Beta HCG qual IFA urine        Primary Care Provider Office Phone # Fax #    Jeanine THIERNO Crews [phone] [address] [facility] [address]        Equal Access to Services     [facility]: Hadii aad ku hadasho Soomaali, waaxda luqadaha, qaybta kaalmada adeegyada, waxay rimain hayrashmi barillas . So [facility] [phone].    ATENCIÓN: Si habla español, tiene a buck disposición servicios gratuitos de asistencia lingüística. Llame al [phone].    We comply with applicable federal civil rights laws and [place] laws. We do not discriminate on the basis of race, color, national origin, age, disability sex, sexual orientation or gender identity.            Thank you!     Thank you for choosing [facility]  for your care. Our goal is always to provide you with excellent care. Hearing back from our patients is one way we can continue to improve our services. Please take a few minutes to complete the written survey that you may receive in the mail after your visit with us. Thank you!             Your Updated Medication List - Protect others around you: Learn how to safely use, store and throw away your medicines at www.disposemymeds.org.          This list is  accurate as of: 8/9/17 11:48 AM.  Always use your most recent med list.                   Brand Name Dispense Instructions for use Diagnosis    PRENATAL ONE DAILY 27-0.8 MG Tabs     100 tablet    Take 1 tablet by mouth daily    Pregnancy test positive       triamcinolone 0.1 % lotion    KENALOG    60 mL    Apply sparingly to affected area three times daily as needed.  Gets at derm and uses as needed

## 2017-08-09 NOTE — TELEPHONE ENCOUNTER
Patient here for 1st OB Nurse Visit.  Wondering if any additional testing or medications needed since she has had 2 miscarriages?  Please advise.  Call her back at [phone].  Dalia Benites RN

## 2017-08-10 NOTE — TELEPHONE ENCOUNTER
Called patient and advised of below.  Schedule lab for 8/14 and 8/16/17 at 2:30 pm.  Gave # to call to schedule U/S.  Patient agrees with plan.  Dalia Benites RN

## 2017-08-14 DIAGNOSIS — Z34.91 PRENATAL CARE IN FIRST TRIMESTER: ICD-10-CM

## 2017-08-14 LAB — B-HCG SERPL-ACNC: ABNORMAL IU/L (ref 0–5)

## 2017-08-14 PROCEDURE — 36415 COLL VENOUS BLD VENIPUNCTURE: CPT | Performed by: FAMILY MEDICINE

## 2017-08-14 PROCEDURE — 84702 CHORIONIC GONADOTROPIN TEST: CPT | Performed by: FAMILY MEDICINE

## 2017-08-16 DIAGNOSIS — Z34.91 PRENATAL CARE IN FIRST TRIMESTER: ICD-10-CM

## 2017-08-16 LAB — B-HCG SERPL-ACNC: ABNORMAL IU/L (ref 0–5)

## 2017-08-16 PROCEDURE — 84702 CHORIONIC GONADOTROPIN TEST: CPT | Performed by: FAMILY MEDICINE

## 2017-08-16 PROCEDURE — 36415 COLL VENOUS BLD VENIPUNCTURE: CPT | Performed by: FAMILY MEDICINE

## 2017-08-17 ENCOUNTER — TELEPHONE (OUTPATIENT)
Dept: FAMILY MEDICINE | Facility: CLINIC | Age: 37
End: 2017-08-17

## 2017-08-17 DIAGNOSIS — O20.0 THREATENED ABORTION: Primary | ICD-10-CM

## 2017-08-17 NOTE — TELEPHONE ENCOUNTER
Pt calls, informed, lab appointment made  Suad Donnelly RN, BSN  Message handled by Nurse Triage.

## 2017-08-17 NOTE — TELEPHONE ENCOUNTER
"Pregnancy test is falling, but not very much. Usually, in a miscarriage, it falls dramatically. In this instance, this is within the \"error of measurement\".  I recommend repeating the test on Monday. Please make a lab only appointment  Meet Olsen for Dr. Corona    "

## 2017-08-21 DIAGNOSIS — O20.0 THREATENED ABORTION: ICD-10-CM

## 2017-08-21 LAB — B-HCG SERPL-ACNC: ABNORMAL IU/L (ref 0–5)

## 2017-08-21 PROCEDURE — 84702 CHORIONIC GONADOTROPIN TEST: CPT | Performed by: FAMILY MEDICINE

## 2017-08-21 PROCEDURE — 36415 COLL VENOUS BLD VENIPUNCTURE: CPT | Performed by: FAMILY MEDICINE

## 2017-08-22 ENCOUNTER — TELEPHONE (OUTPATIENT)
Dept: FAMILY MEDICINE | Facility: CLINIC | Age: 37
End: 2017-08-22

## 2017-08-22 NOTE — TELEPHONE ENCOUNTER
US is appropriate, and tomorrow is fine.   Is pt experiencing any cramping, bleeding?    Marck Lagos MD

## 2017-08-22 NOTE — TELEPHONE ENCOUNTER
Erick Blanco MD Fedderly, Kelli K, RN                   Send this to Dr Lagos or Dr Crews's OB coverage for disposition   Erick Blanco      Notes Recorded by Dalia Benites, RN on 8/22/2017 at 8:49 AM  Dr. Lagos-please help with this.  She does have U/S scheduled for 8/23/17.  Refer to OB?  Please advise.  Making into telephone encounter.  See telephone encounter.  Thanks, Dalia Benites RN    ------    Notes Recorded by Dalia Benites RN on 8/21/2017 at 6:21 PM  U/S 8/23/17.  Just go ahead with that and see what U/S shows?  Please advise.  Dalia Benites RN    ------    Notes Recorded by Erick Blanco MD on 8/21/2017 at 4:43 PM  The placental hormone is falling. This suggests that you are miscarrying. We will have to see how it goes  ERICK BLANCO for Dr. Corona    CAll pt and commiserate.   Erick Blanco

## 2017-08-22 NOTE — TELEPHONE ENCOUNTER
Called patient and advised Quant dropping.  Will have U/S tomorrow.  Not having any bleeding or cramping.  Discussed will see what U/S shows tomorrow.  Dalia Benites RN

## 2017-08-23 ENCOUNTER — OFFICE VISIT (OUTPATIENT)
Dept: OBGYN | Facility: CLINIC | Age: 37
End: 2017-08-23
Payer: COMMERCIAL

## 2017-08-23 ENCOUNTER — RADIANT APPOINTMENT (OUTPATIENT)
Dept: ULTRASOUND IMAGING | Facility: CLINIC | Age: 37
End: 2017-08-23
Attending: FAMILY MEDICINE
Payer: COMMERCIAL

## 2017-08-23 VITALS
WEIGHT: 160 LBS | SYSTOLIC BLOOD PRESSURE: 132 MMHG | BODY MASS INDEX: 30.21 KG/M2 | HEIGHT: 61 IN | DIASTOLIC BLOOD PRESSURE: 86 MMHG

## 2017-08-23 DIAGNOSIS — N96 HISTORY OF RECURRENT MISCARRIAGES: ICD-10-CM

## 2017-08-23 DIAGNOSIS — O03.9 MISCARRIAGE: Primary | ICD-10-CM

## 2017-08-23 DIAGNOSIS — Z34.91 PRENATAL CARE IN FIRST TRIMESTER: ICD-10-CM

## 2017-08-23 PROCEDURE — 76801 OB US < 14 WKS SINGLE FETUS: CPT | Performed by: OBSTETRICS & GYNECOLOGY

## 2017-08-23 PROCEDURE — 76817 TRANSVAGINAL US OBSTETRIC: CPT | Performed by: OBSTETRICS & GYNECOLOGY

## 2017-08-23 PROCEDURE — 99212 OFFICE O/P EST SF 10 MIN: CPT | Performed by: FAMILY MEDICINE

## 2017-08-23 RX ORDER — HYDROCODONE BITARTRATE AND ACETAMINOPHEN 5; 325 MG/1; MG/1
1 TABLET ORAL EVERY 4 HOURS PRN
Qty: 10 TABLET | Refills: 0 | Status: SHIPPED | OUTPATIENT
Start: 2017-08-23 | End: 2018-02-19

## 2017-08-23 RX ORDER — MISOPROSTOL 200 UG/1
200 TABLET ORAL 4 TIMES DAILY
Qty: 8 TABLET | Refills: 0 | Status: SHIPPED | OUTPATIENT
Start: 2017-08-23 | End: 2018-02-19

## 2017-08-23 NOTE — MR AVS SNAPSHOT
After Visit Summary   8/23/2017    Misty ESCOBEDO Handler    MRN: [id number]           Patient Information     Date Of Birth          1980        Visit Information        Provider Department      8/23/2017 2:30 PM Khushi Villasenor,  [facility]        Today's Diagnoses     Miscarriage    -  1    History of recurrent miscarriages          Care Instructions    Call Lab [phone] to schedule future labs.   If you are getting cholesterol checked please fast 8 hours   Call insurance about genetic testing     Dr. Khushi Villasenor, DO    Obstetrics and Gynecology  [facility] and [place]                   Follow-ups after your visit        Your next 10 appointments already scheduled     Aug 23, 2017  2:30 PM CDT   SHORT with Khushi Villasenor DO   [facility] ([facility])    [address]  [facility] [id number] [phone]            Sep 20, 2017  8:30 AM CDT   New Prenatal with Jeanine Crews MD   [facility] ([facility]    [address] [place] [address] [phone]              Future tests that were ordered for you today     Open Future Orders        Priority Expected Expires Ordered    Limited G-band Chromosome Analysis Routine  8/23/2017 8/23/2017    Beta 2 Glycoprotein 1 Antibody IgA Routine  8/23/2018 8/23/2017    Beta 2 Glycoprotein 1 Antibody IgG Routine  8/23/2018 8/23/2017    Beta 2 Glycoprotein 1 Antibody IgM Routine  8/23/2018 8/23/2017    Cardiolipin Nely IgG and IgM Routine  8/23/2018 8/23/2017    Cardiolipin Antibody IgA Routine  8/23/2018 8/23/2017            Who to contact     If you have questions or need follow up information about today's clinic visit or your schedule please contact [facility] directly at [phone].  Normal or non-critical lab and imaging results will be communicated to you by MyChart, letter or  "phone within 4 business days after the clinic has received the results. If you do not hear from us within 7 days, please contact the clinic through [employer] or phone. If you have a critical or abnormal lab result, we will notify you by phone as soon as possible.  Submit refill requests through [employer] or call your pharmacy and they will forward the refill request to us. Please allow 3 business days for your refill to be completed.          Additional Information About Your Visit        [employer] Information     [employer] lets you send messages to your doctor, view your test results, renew your prescriptions, schedule appointments and more. To sign up, go to [email or web address] . Click on \"Log in\" on the left side of the screen, which will take you to the Welcome page. Then click on \"Sign up Now\" on the right side of the page.     You will be asked to enter the access code listed below, as well as some personal information. Please follow the directions to create your username and password.     Your access code is: [email or web address]  Expires: 2017 11:48 AM     Your access code will  in 90 days. If you need help or a new code, please call your [place] clinic or [phone].        Care EveryWhere ID     This is your Care EveryWhere ID. This could be used by other organizations to access your [place] medical records  [id number]        Your Vitals Were     Height Last Period BMI (Body Mass Index)             5' 1\" (1.549 m) 2017 30.23 kg/m2          Blood Pressure from Last 3 Encounters:   17 132/86   17 126/85   17 128/86    Weight from Last 3 Encounters:   17 160 lb (72.6 kg)   17 160 lb (72.6 kg)   17 160 lb 6.4 oz (72.8 kg)                 Today's Medication Changes          These changes are accurate as of: 17  1:56 PM.  If you have any questions, ask your nurse or doctor.               Start taking these medicines.        Dose/Directions    " HYDROcodone-acetaminophen 5-325 MG per tablet   Commonly known as:  NORCO   Used for:  Miscarriage   Started by:  Khushi Villasenor DO        Dose:  1 tablet   Take 1 tablet by mouth every 4 hours as needed for pain maximum 6 tablet(s) per day   Quantity:  10 tablet   Refills:  0            Where to get your medicines      Some of these will need a paper prescription and others can be bought over the counter.  Ask your nurse if you have questions.     Bring a paper prescription for each of these medications     HYDROcodone-acetaminophen 5-325 MG per tablet                Primary Care Provider Office Phone # Fax #    Jeanine Crews [phone] [address] [facility] [address]        Equal Access to Services     DEISY BOND : Matthew Suero, janice granados, elliott oreilly, yadira ozuna. So [facility] [phone].    ATENCIÓN: Si habla español, tiene a buck disposición servicios gratuitos de asistencia lingüística. Llame al [phone].    We comply with applicable federal civil rights laws and [place] laws. We do not discriminate on the basis of race, color, national origin, age, disability sex, sexual orientation or gender identity.            Thank you!     Thank you for choosing [facility]  for your care. Our goal is always to provide you with excellent care. Hearing back from our patients is one way we can continue to improve our services. Please take a few minutes to complete the written survey that you may receive in the mail after your visit with us. Thank you!             Your Updated Medication List - Protect others around you: Learn how to safely use, store and throw away your medicines at www.disposemymeds.org.          This list is accurate as of: 8/23/17  1:56 PM.  Always use your most recent med list.                   Brand Name Dispense Instructions for use Diagnosis    HYDROcodone-acetaminophen 5-325  MG per tablet    NORCO    10 tablet    Take 1 tablet by mouth every 4 hours as needed for pain maximum 6 tablet(s) per day    Miscarriage       PRENATAL ONE DAILY 27-0.8 MG Tabs     100 tablet    Take 1 tablet by mouth daily    Pregnancy test positive       triamcinolone 0.1 % lotion    KENALOG    60 mL    Apply sparingly to affected area three times daily as needed.  Gets at derm and uses as needed

## 2017-08-23 NOTE — NURSING NOTE
"8w4d    Chief Complaint   Patient presents with     Miscarriage       Initial /86  Ht 5' 1\" (1.549 m)  Wt 160 lb (72.6 kg)  LMP 06/24/2017  BMI 30.23 kg/m2 Estimated body mass index is 30.23 kg/(m^2) as calculated from the following:    Height as of this encounter: 5' 1\" (1.549 m).    Weight as of this encounter: 160 lb (72.6 kg).  Medication Reconciliation: lisa Salazar CMA      "

## 2017-08-23 NOTE — PROGRESS NOTES
"S:  Missed , falling quantitative bhcg      O: /86  Ht 5' 1\" (1.549 m)  Wt 160 lb (72.6 kg)  LMP 2017  BMI 30.23 kg/m2     GENERAL healthy, alert and no distress  CV: NEGATIVE  Chest: CTA       Assessment:  36 year old y/o  presents with following issues:      1. Missed :  D/w patient observation, induction with cytotec, or suction dilation and curettage,   She prefers cytotec induction (medical induction) risks, benefits and precautions are given to patient    And pain control with vicodin should she need it.       Dr. Khushi Villasenor, DO    Obstetrics and Gynecology  [facility] and [place]             "

## 2017-08-23 NOTE — PATIENT INSTRUCTIONS
Call Lab [phone] to schedule future labs.   If you are getting cholesterol checked please fast 8 hours   Call insurance about genetic testing     Dr. Khushi Villasenor, DO    Obstetrics and Gynecology  [facility] [place] and [place]

## 2017-08-25 ENCOUNTER — TELEPHONE (OUTPATIENT)
Dept: FAMILY MEDICINE | Facility: CLINIC | Age: 37
End: 2017-08-25

## 2017-08-25 NOTE — TELEPHONE ENCOUNTER
Went to check on result and called Pallavi as did not see anyone called her.  Dr. Villasenor already saw her and handled this.  Dr. Cao- I cancelled her 1st OB appt and crossed off in book.  Referral to OB/GYN to address since 3rd miscarriage?  Or wait til she asks.  Just wanted you aware in case you wanted to call her.  Dalia Benites, RN    17  1. Missed :  D/w patient observation, induction with cytotec, or suction dilation and curettage,   She prefers cytotec induction (medical induction) risks, benefits and precautions are given to patient    And pain control with vicodin should she need it.         Dr. Khushi Villasenor, DO    Obstetrics and Gynecology  [facility] [place] and [place]        Notes Recorded by Meet Olsen MD on 2017 at 5:06 PM  Can you manage this for Jeanine? I had my MA call Pallavi as well  Thanks  Meet Olsen

## 2017-08-28 NOTE — TELEPHONE ENCOUNTER
Yes - she needs evaluation by OB or by fertility specialist since 3rd miscarriage.   I will sign referral.   Dr. Villasenor may have already taken care of this but lets check with patient and see

## 2017-11-18 ENCOUNTER — HEALTH MAINTENANCE LETTER (OUTPATIENT)
Age: 37
End: 2017-11-18

## 2018-01-19 DIAGNOSIS — O03.9 MISCARRIAGE: ICD-10-CM

## 2018-01-19 PROCEDURE — 36415 COLL VENOUS BLD VENIPUNCTURE: CPT | Performed by: FAMILY MEDICINE

## 2018-01-19 PROCEDURE — 84443 ASSAY THYROID STIM HORMONE: CPT | Performed by: FAMILY MEDICINE

## 2018-01-21 LAB — TSH SERPL DL<=0.005 MIU/L-ACNC: 2.61 MU/L (ref 0.4–4)

## 2018-02-05 DIAGNOSIS — O03.9 MISCARRIAGE: ICD-10-CM

## 2018-02-05 PROCEDURE — 36415 COLL VENOUS BLD VENIPUNCTURE: CPT | Performed by: FAMILY MEDICINE

## 2018-02-05 PROCEDURE — 86147 CARDIOLIPIN ANTIBODY EA IG: CPT | Performed by: FAMILY MEDICINE

## 2018-02-05 PROCEDURE — 86146 BETA-2 GLYCOPROTEIN ANTIBODY: CPT | Performed by: FAMILY MEDICINE

## 2018-02-07 LAB
B2 GLYCOPROT1 IGA SER-ACNC: 1 U/ML
B2 GLYCOPROT1 IGG SERPL IA-ACNC: <0.6 U/ML
B2 GLYCOPROT1 IGM SERPL IA-ACNC: 1.4 U/ML
CARDIOLIPIN ANTIBODY IGG: <1.6 GPL-U/ML (ref 0–19.9)
CARDIOLIPIN ANTIBODY IGM: <0.2 MPL-U/ML (ref 0–19.9)
CARDIOLIPIN IGA SERPL-ACNC: 1.7 APL U/ML (ref 0–19.9)

## 2018-02-08 PROBLEM — O03.9 MISCARRIAGE: Status: ACTIVE | Noted: 2018-02-08

## 2018-02-19 ENCOUNTER — OFFICE VISIT (OUTPATIENT)
Dept: OBGYN | Facility: CLINIC | Age: 38
End: 2018-02-19
Payer: COMMERCIAL

## 2018-02-19 VITALS
BODY MASS INDEX: 29.89 KG/M2 | HEART RATE: 88 BPM | SYSTOLIC BLOOD PRESSURE: 128 MMHG | DIASTOLIC BLOOD PRESSURE: 76 MMHG | HEIGHT: 61 IN | WEIGHT: 158.3 LBS

## 2018-02-19 DIAGNOSIS — N96 HISTORY OF RECURRENT MISCARRIAGES, NOT CURRENTLY PREGNANT: Primary | ICD-10-CM

## 2018-02-19 DIAGNOSIS — R87.610 PAP SMEAR WITH ATYPICAL SQUAMOUS CELLS, CANNOT EXCLUDE HIGH GRADE SQUAMOUS INTRAEPITHELIAL LESION (ASC-H): ICD-10-CM

## 2018-02-19 PROCEDURE — 88175 CYTOPATH C/V AUTO FLUID REDO: CPT | Performed by: FAMILY MEDICINE

## 2018-02-19 PROCEDURE — 88264 CHROMOSOME ANALYSIS 20-25: CPT | Performed by: FAMILY MEDICINE

## 2018-02-19 PROCEDURE — 88230 TISSUE CULTURE LYMPHOCYTE: CPT | Performed by: FAMILY MEDICINE

## 2018-02-19 PROCEDURE — 88141 CYTOPATH C/V INTERPRET: CPT | Performed by: FAMILY MEDICINE

## 2018-02-19 PROCEDURE — 88289 CHROMOSOME STUDY ADDITIONAL: CPT | Performed by: FAMILY MEDICINE

## 2018-02-19 PROCEDURE — 36415 COLL VENOUS BLD VENIPUNCTURE: CPT | Performed by: FAMILY MEDICINE

## 2018-02-19 PROCEDURE — 87624 HPV HI-RISK TYP POOLED RSLT: CPT | Performed by: FAMILY MEDICINE

## 2018-02-19 PROCEDURE — 99213 OFFICE O/P EST LOW 20 MIN: CPT | Performed by: FAMILY MEDICINE

## 2018-02-19 NOTE — NURSING NOTE
"Chief Complaint   Patient presents with     Fertility       Initial /76  Pulse 88  Ht 5' 1\" (1.549 m)  Wt 158 lb 4.8 oz (71.8 kg)  LMP 2018 (Approximate)  Breastfeeding? Unknown  BMI 29.91 kg/m2 Estimated body mass index is 29.91 kg/(m^2) as calculated from the following:    Height as of this encounter: 5' 1\" (1.549 m).    Weight as of this encounter: 158 lb 4.8 oz (71.8 kg).  BP completed using cuff size: regular        The following HM Due: Vaccinations: flu shot      The following patient reported/Care Every where data was sent to:  P ABSTRACT QUALITY INITIATIVES [16107]  na      Pt declines to have flu shot.              "

## 2018-02-19 NOTE — PATIENT INSTRUCTIONS
Get lab today     Call insurance or check website of clinic to help determine insurance coverage       Dr. Khushi Villasenor, DO    Obstetrics and Gynecology  [facility] [place] and [place]

## 2018-02-19 NOTE — PROGRESS NOTES
SUBJECTIVE:  Misty ESCOBEDO Handler is an 37 year old  woman who presents for   gynecology consult for recurrent miscarriages.  Patient's last menstrual period was 2018 (approximate). Periods are regular q 28-30 days, lasting 4-5 days. Dysmenorrhea:none. Cyclic symptoms include none. No intermenstrual bleeding, spotting, or discharge.    Current contraception: none  History of abnormal Pap smear: Yes: ASCUS, ASCH & + HPV - recommended pap every 6 months  Family history of uterine or ovarian cancer: No  History of abnormal mammogram: No  Family history of breast cancer: No    Concerns today:     - Pt has experienced 3 miscarriages in the last 2 years & carried one child to term/healthy delivery prior to this. She is unsure what her next steps should be, as she desires to have more children. She reports normal cycles, but has not had sexual intercourse since her last miscarriage 2017. She wants to have a better understanding before trying to conceive again.      Past Medical History:   Diagnosis Date     Abnormal Pap smear, can't excl hi gd sq intraepithelial lesion (ASC-H) 01/10/11     Cervical high risk HPV (human papillomavirus) test positive 2017 Pap= NIL,+ HR HPV type 16. Pt miscarried.     Papanicolaou smear of cervix with atypical squamous cells of undetermined significance (ASC-US)     repeat PAP nl, no colposcopy or biopsy done.      Unspecified contraceptive management           Family History   Problem Relation Age of Onset     Arthritis Mother      Family History Negative Father        Past Surgical History:   Procedure Laterality Date     C/SECTION, LOW TRANSVERSE  11    , Low Transverse for failure to descend       No current outpatient prescriptions on file.     No current facility-administered medications for this visit.      Allergies   Allergen Reactions     No Known Drug Allergies        Social History   Substance Use Topics     Smoking status:  "Former Smoker     Years: 5.00     Quit date: 2006     Smokeless tobacco: Never Used     Alcohol use No      Comment: Social       Review Of Systems  Ears/Nose/Throat: negative  Respiratory: No shortness of breath, dyspnea on exertion, cough, or hemoptysis  Cardiovascular: negative  Gastrointestinal: negative  Genitourinary: negative  Constitutional, HEENT, cardiovascular, pulmonary, GI, , musculoskeletal, neuro, skin, endocrine and psych systems are negative, except as otherwise noted.      This document serves as a record of the services and decisions personally performed and made by Khushi Villasenor DO. It was created on her behalf by Rita Santos, a trained medical scribe. The creation of this document is based the provider's statements to the medical scribe.  Scribe Rita Santos 1:29 PM, 2018    OBJECTIVE:  /76  Pulse 88  Ht 1.549 m (5' 1\")  Wt 71.8 kg (158 lb 4.8 oz)  LMP 2018 (Approximate)  Breastfeeding? Unknown  BMI 29.91 kg/m2  General appearance: healthy, alert and mild distress  Skin: Skin color, texture, turgor normal. No rashes or lesions.  Lungs: negative, Percussion normal. Good diaphragmatic excursion. Lungs clear  Heart: negative, PMI normal. No lifts, heaves, or thrills. RRR. No murmurs, clicks gallops or rub  Pelvic: Pelvic examination with pap  including  External genitalia normal   and vagina normal rugatted not atrophic  Examination of urethra normal no masses, tenderness, scarring  bladder, no masses or tenderness  Cervix no lesions or discharge  Bimanual exam with   Uterus 6 weeks size, mid position, mobile, no tenderness, no descent   Adnexa/parametria normal        LABS:  Chromosome analysis - results pending        ASSESSMENT:  Misty ESCOBEDO Handlekira is an 37 year old  woman who presents for   gynecology consult for recurrent miscarriages.  Concerns today: Further treatment    PLAN:  Dx: Repeat pap; Recurrent miscarriages  1)  Repeat pap: Pathology " pending  2)  Recurrent miscarriages:   - Chromosome analysis lab ordered - results pending; US ordered, to be scheduled   - Information given on specialist for further care -- clinics & possible treatments:  Recommend referral to determine specific  treatment for next pregnancy. Referral information given.    3)  Return to clinic prn.      Rx:  None      The information in this document, created by the medical scribe for me, accurately reflects the services I personally performed and the decisions made by me. I have reviewed and approved this document for accuracy prior to leaving the patient care area.  1:29 PM, 02/19/18    Dr. Khushi Villasenor, DO    Obstetrics and Gynecology  [facility]

## 2018-02-21 ENCOUNTER — TELEPHONE (OUTPATIENT)
Dept: OBGYN | Facility: CLINIC | Age: 38
End: 2018-02-21

## 2018-02-21 NOTE — TELEPHONE ENCOUNTER
"  Dr Yuliya Mejias from [facility] calling regading   Order. [id number]    Current order #[id number] chromosome sex analysis.  Recommended order is \" chromosome blood high resolution (G bands). Lab 4690   This is the test to order for anyone with a h/o miscarriages.  They have the right specimen, need new order to go forward .  Dr Mejias said she would change order unless there was a specific reason you wanted the chromosome sex analysis.  Please advise.  Naima Anaya RN    "

## 2018-02-23 LAB
COPATH REPORT: ABNORMAL
PAP: ABNORMAL

## 2018-02-26 LAB
FINAL DIAGNOSIS: ABNORMAL
HPV HR 12 DNA CVX QL NAA+PROBE: NEGATIVE
HPV16 DNA SPEC QL NAA+PROBE: POSITIVE
HPV18 DNA SPEC QL NAA+PROBE: NEGATIVE
SPECIMEN DESCRIPTION: ABNORMAL
SPECIMEN SOURCE CVX/VAG CYTO: ABNORMAL

## 2018-03-05 LAB — COPATH REPORT: NORMAL

## 2018-03-12 LAB — COPATH REPORT: NORMAL

## 2018-03-13 ENCOUNTER — RADIANT APPOINTMENT (OUTPATIENT)
Dept: ULTRASOUND IMAGING | Facility: CLINIC | Age: 38
End: 2018-03-13
Attending: FAMILY MEDICINE
Payer: COMMERCIAL

## 2018-03-13 DIAGNOSIS — N96 HISTORY OF RECURRENT MISCARRIAGES, NOT CURRENTLY PREGNANT: ICD-10-CM

## 2018-03-13 PROCEDURE — 76830 TRANSVAGINAL US NON-OB: CPT | Performed by: OBSTETRICS & GYNECOLOGY

## 2018-04-02 ENCOUNTER — TELEPHONE (OUTPATIENT)
Dept: OBGYN | Facility: CLINIC | Age: 38
End: 2018-04-02

## 2018-04-02 DIAGNOSIS — O03.9 MISCARRIAGE: Primary | ICD-10-CM

## 2018-04-02 NOTE — TELEPHONE ENCOUNTER
Signed!   Dr. Khushi Villasenor, DO    Obstetrics and Gynecology  [facility] and [place]

## 2018-04-27 DIAGNOSIS — O03.9 MISCARRIAGE: ICD-10-CM

## 2018-04-27 LAB
ESTRADIOL SERPL-MCNC: 40 PG/ML
FSH SERPL-ACNC: 7.9 IU/L
LH SERPL-ACNC: 4.5 IU/L

## 2018-04-27 PROCEDURE — 82670 ASSAY OF TOTAL ESTRADIOL: CPT | Performed by: FAMILY MEDICINE

## 2018-04-27 PROCEDURE — 36415 COLL VENOUS BLD VENIPUNCTURE: CPT | Performed by: FAMILY MEDICINE

## 2018-04-27 PROCEDURE — 83002 ASSAY OF GONADOTROPIN (LH): CPT | Performed by: FAMILY MEDICINE

## 2018-04-27 PROCEDURE — 83001 ASSAY OF GONADOTROPIN (FSH): CPT | Performed by: FAMILY MEDICINE

## 2018-04-27 PROCEDURE — 99000 SPECIMEN HANDLING OFFICE-LAB: CPT | Performed by: FAMILY MEDICINE

## 2018-04-27 PROCEDURE — 83520 IMMUNOASSAY QUANT NOS NONAB: CPT | Mod: 90 | Performed by: FAMILY MEDICINE

## 2018-04-27 NOTE — LETTER
[facility]  [address] [facility] [facility] [id number] [phone]        May 17, 2018    Misty ESCOBEDO Handler                                                                                                            [address] [facility] [address]              Dear Misty,    I have reviewed your recent laboratory tests.  I am pleased to report that the following tests were normal.  (estrogen, LH and FSH)   The level of the AMH is slightly low at 0.5 and sometimes this can cause infertility problems.  I am not sure about the recurrent miscarriage risk.  I think the Reproductive Medicine will go over in more detail regarding the significance.   Please review the enclosed lab values.     If you have any problems or concerns, please call myself or my nurse at [phone].      Sincerely,        Dr Villasenor, DO

## 2018-04-28 LAB — MIS SERPL-MCNC: 0.54 NG/ML (ref 0.18–11.71)

## 2018-06-09 ENCOUNTER — HEALTH MAINTENANCE LETTER (OUTPATIENT)
Age: 38
End: 2018-06-09

## 2018-08-29 ENCOUNTER — TRANSFERRED RECORDS (OUTPATIENT)
Dept: HEALTH INFORMATION MANAGEMENT | Facility: CLINIC | Age: 38
End: 2018-08-29

## 2018-10-20 ENCOUNTER — HEALTH MAINTENANCE LETTER (OUTPATIENT)
Age: 38
End: 2018-10-20

## 2018-12-27 ENCOUNTER — TELEPHONE (OUTPATIENT)
Dept: FAMILY MEDICINE | Facility: CLINIC | Age: 38
End: 2018-12-27

## 2018-12-27 DIAGNOSIS — R87.613 HSIL ON PAP SMEAR OF CERVIX: ICD-10-CM

## 2018-12-27 NOTE — TELEPHONE ENCOUNTER
Pt is past due for f/u pap smear if declining colposcopy.  [facility] clinic and schedule.  Lucila Shay,    Pap Tracking

## 2019-01-10 PROBLEM — R87.613 HSIL ON PAP SMEAR OF CERVIX: Status: ACTIVE | Noted: 2018-02-19

## 2019-02-11 NOTE — MR AVS SNAPSHOT
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 Khushi Villasenor, DO    Obstetrics and Gynecology  [facility]                 Follow-ups after your visit        Who to contact     If you have questions or need follow up information about today's clinic visit or your schedule please contact [facility] directly at [phone].  Normal or non-critical lab and imaging results will be communicated to you by [facility], letter or phone within 4 business days after the clinic has received the results. If you do not hear from us within 7 days, please contact the clinic through [facility] or phone. If you have a critical or abnormal lab result, we will notify you by phone as soon as possible.  Submit refill requests through [facility] or call your pharmacy and they will forward the refill request to us. Please allow 3 business days for your refill to be completed.          Additional Information About Your Visit        [facility] Information     [facility] lets you send messages to your doctor, view your test results, renew your prescriptions, schedule appointments and more. To sign up, go to [email or web address] . Click on \"Log in\" on the left side of the screen, which will take you to the Welcome page. Then click on \"Sign up Now\" on the right side of the page.     You will be asked to enter the access code listed below, as well as some personal information. Please follow the directions to create your username and password.     Your " "access code is: [email or web address]  Expires: 2018  1:50 PM     Your access code will  in 90 days. If you need help or a new code, please call your [facility] clinic or [phone].        Care EveryWhere ID     This is your Care EveryWhere ID. This could be used by other organizations to access your [facility] medical records  [id number]        Your Vitals Were     Pulse Height Last Period Breastfeeding? BMI (Body Mass Index)       88 5' 1\" (1.549 m) 2018 (Approximate) Unknown 29.91 kg/m2        Blood Pressure from Last 3 Encounters:   18 128/76   17 132/86   17 126/85    Weight from Last 3 Encounters:   18 158 lb 4.8 oz (71.8 kg)   17 160 lb (72.6 kg)   17 160 lb (72.6 kg)              We Performed the Following     CHROMOSOME SEX ANALYSIS With Professional Interpretation          Today's Medication Changes          These changes are accurate as of 18  1:50 PM.  If you have any questions, ask your nurse or doctor.               Stop taking these medicines if you haven't already. Please contact your care team if you have questions.     HYDROcodone-acetaminophen 5-325 MG per tablet   Commonly known as:  NORCO   Stopped by:  Khushi Villasenor DO           misoprostol 200 MCG tablet   Commonly known as:  CYTOTEC   Stopped by:  Khushi Villasenor DO           PRENATAL ONE DAILY 27-0.8 MG Tabs   Stopped by:  Khushi Villasenor DO           triamcinolone 0.1 % lotion   Commonly known as:  KENALOG   Stopped by:  Khushi Villasenor DO                    Primary Care Provider Office Phone # Fax #    Jeanine Crews [phone] [address] [facility] [address]        Equal Access to Services     [facility] Hospital[facility]ER[facility] : Matthew Suero, waaxda luqadaha, qaybta kaalmada denilson, yadira ozuna. So [facility] [phone].    ATENCIÓN: Si habla español, tiene a buck disposición servicios gratuitos de asistencia " lingüísticaAimee Alvarenga al [phone].    We comply with applicable federal civil rights laws and [place] laws. We do not discriminate on the basis of race, color, national origin, age, disability, sex, sexual orientation, or gender identity.            Thank you!     Thank you for choosing [facility]  for your care. Our goal is always to provide you with excellent care. Hearing back from our patients is one way we can continue to improve our services. Please take a few minutes to complete the written survey that you may receive in the mail after your visit with us. Thank you!             Your Updated Medication List - Protect others around you: Learn how to safely use, store and throw away your medicines at www.disposemymeds.org.      Notice  As of 2/19/2018  1:50 PM    You have not been prescribed any medications.       Never smoker

## 2019-02-15 ENCOUNTER — HEALTH MAINTENANCE LETTER (OUTPATIENT)
Age: 39
End: 2019-02-15

## 2019-05-03 ENCOUNTER — HEALTH MAINTENANCE LETTER (OUTPATIENT)
Age: 39
End: 2019-05-03

## 2019-10-02 ENCOUNTER — HEALTH MAINTENANCE LETTER (OUTPATIENT)
Age: 39
End: 2019-10-02

## 2021-01-15 ENCOUNTER — HEALTH MAINTENANCE LETTER (OUTPATIENT)
Age: 41
End: 2021-01-15

## 2021-06-22 NOTE — PROGRESS NOTES
The placental hormone is falling. This suggests that you are miscarrying. We will have to see how it goes  ERICK BLANCO for Dr. Corona    CAll pt and commiserate.   Erick Blanco     235.4

## 2021-09-04 ENCOUNTER — HEALTH MAINTENANCE LETTER (OUTPATIENT)
Age: 41
End: 2021-09-04

## 2022-02-19 ENCOUNTER — HEALTH MAINTENANCE LETTER (OUTPATIENT)
Age: 42
End: 2022-02-19

## 2022-10-22 ENCOUNTER — HEALTH MAINTENANCE LETTER (OUTPATIENT)
Age: 42
End: 2022-10-22

## 2023-04-01 ENCOUNTER — HEALTH MAINTENANCE LETTER (OUTPATIENT)
Age: 43
End: 2023-04-01

## 2024-01-14 ENCOUNTER — HEALTH MAINTENANCE LETTER (OUTPATIENT)
Age: 44
End: 2024-01-14